# Patient Record
Sex: MALE | Race: WHITE | NOT HISPANIC OR LATINO | Employment: OTHER | URBAN - METROPOLITAN AREA
[De-identification: names, ages, dates, MRNs, and addresses within clinical notes are randomized per-mention and may not be internally consistent; named-entity substitution may affect disease eponyms.]

---

## 2017-01-17 ENCOUNTER — APPOINTMENT (OUTPATIENT)
Dept: ULTRASOUND IMAGING | Facility: HOSPITAL | Age: 67
End: 2017-01-17

## 2017-01-17 ENCOUNTER — HOSPITAL ENCOUNTER (EMERGENCY)
Facility: HOSPITAL | Age: 67
Discharge: HOME | End: 2017-01-17
Attending: EMERGENCY MEDICINE | Admitting: EMERGENCY MEDICINE

## 2017-01-17 VITALS
DIASTOLIC BLOOD PRESSURE: 72 MMHG | TEMPERATURE: 98.2 F | BODY MASS INDEX: 20.46 KG/M2 | HEART RATE: 65 BPM | SYSTOLIC BLOOD PRESSURE: 101 MMHG | OXYGEN SATURATION: 100 % | WEIGHT: 135 LBS | RESPIRATION RATE: 16 BRPM | HEIGHT: 68 IN

## 2017-01-17 DIAGNOSIS — S40.022A TRAUMATIC ECCHYMOSIS OF LEFT UPPER ARM, INITIAL ENCOUNTER: ICD-10-CM

## 2017-01-17 DIAGNOSIS — S40.812A ABRASION OF LEFT ARM, INITIAL ENCOUNTER: ICD-10-CM

## 2017-01-17 DIAGNOSIS — S40.022A TRAUMATIC HEMATOMA OF LEFT UPPER ARM, INITIAL ENCOUNTER: Primary | ICD-10-CM

## 2017-01-17 DIAGNOSIS — S20.419A ABRASION OF BACK, UNSPECIFIED LATERALITY, INITIAL ENCOUNTER: ICD-10-CM

## 2017-01-17 LAB
ALBUMIN SERPL-MCNC: 3.5 G/DL (ref 3.5–5.2)
ALBUMIN/GLOB SERPL: 1.6 G/DL
ALP SERPL-CCNC: 84 U/L (ref 40–129)
ALT SERPL W P-5'-P-CCNC: 13 U/L (ref 5–41)
ANION GAP SERPL CALCULATED.3IONS-SCNC: 7.3 MMOL/L
AST SERPL-CCNC: 18 U/L (ref 5–40)
BILIRUB SERPL-MCNC: 1.6 MG/DL (ref 0.2–1.2)
BUN BLD-MCNC: 21 MG/DL (ref 8–23)
BUN/CREAT SERPL: 20.6 (ref 7–25)
CALCIUM SPEC-SCNC: 8.6 MG/DL (ref 8.8–10.5)
CHLORIDE SERPL-SCNC: 103 MMOL/L (ref 98–107)
CO2 SERPL-SCNC: 29.7 MMOL/L (ref 22–29)
CREAT BLD-MCNC: 1.02 MG/DL (ref 0.76–1.27)
GFR SERPL CREATININE-BSD FRML MDRD: 73 ML/MIN/1.73
GLOBULIN UR ELPH-MCNC: 2.2 GM/DL
GLUCOSE BLD-MCNC: 104 MG/DL (ref 65–99)
HCT VFR BLD AUTO: 40.3 % (ref 42–52)
HGB BLD-MCNC: 13.4 G/DL (ref 14–18)
INR PPP: 1.96 (ref 0.9–1.1)
POTASSIUM BLD-SCNC: 4.7 MMOL/L (ref 3.5–5.2)
PROT SERPL-MCNC: 5.7 G/DL (ref 6–8.5)
PROTHROMBIN TIME: 22.6 SECONDS (ref 12.1–15)
SODIUM BLD-SCNC: 140 MMOL/L (ref 136–145)

## 2017-01-17 PROCEDURE — 93971 EXTREMITY STUDY: CPT

## 2017-01-17 PROCEDURE — 85014 HEMATOCRIT: CPT | Performed by: EMERGENCY MEDICINE

## 2017-01-17 PROCEDURE — 85610 PROTHROMBIN TIME: CPT | Performed by: EMERGENCY MEDICINE

## 2017-01-17 PROCEDURE — 99283 EMERGENCY DEPT VISIT LOW MDM: CPT | Performed by: EMERGENCY MEDICINE

## 2017-01-17 PROCEDURE — 99283 EMERGENCY DEPT VISIT LOW MDM: CPT

## 2017-01-17 PROCEDURE — 85018 HEMOGLOBIN: CPT | Performed by: EMERGENCY MEDICINE

## 2017-01-17 PROCEDURE — 80053 COMPREHEN METABOLIC PANEL: CPT | Performed by: EMERGENCY MEDICINE

## 2017-01-17 RX ORDER — FLUOXETINE HYDROCHLORIDE 20 MG/1
20 CAPSULE ORAL DAILY
COMMUNITY

## 2017-01-17 RX ORDER — TAMSULOSIN HYDROCHLORIDE 0.4 MG/1
1 CAPSULE ORAL NIGHTLY
COMMUNITY

## 2017-01-17 RX ORDER — METHYLPREDNISOLONE 4 MG/1
2 TABLET ORAL DAILY
COMMUNITY

## 2017-01-17 RX ORDER — FINASTERIDE 5 MG/1
5 TABLET, FILM COATED ORAL DAILY
COMMUNITY

## 2017-01-17 RX ORDER — OMEGA-3S/DHA/EPA/FISH OIL/D3 300MG-1000
400 CAPSULE ORAL 2 TIMES DAILY
COMMUNITY

## 2017-01-17 RX ORDER — ACETAMINOPHEN 325 MG/1
650 TABLET ORAL EVERY 6 HOURS PRN
COMMUNITY

## 2017-01-17 RX ORDER — PRAVASTATIN SODIUM 40 MG
40 TABLET ORAL DAILY
COMMUNITY

## 2017-01-17 RX ORDER — SENNA AND DOCUSATE SODIUM 50; 8.6 MG/1; MG/1
2 TABLET, FILM COATED ORAL NIGHTLY
COMMUNITY

## 2017-01-17 RX ORDER — ASPIRIN 81 MG/1
81 TABLET ORAL DAILY
COMMUNITY

## 2017-01-17 RX ORDER — CLONAZEPAM 0.5 MG/1
0.5 TABLET ORAL 3 TIMES DAILY PRN
COMMUNITY

## 2017-01-17 NOTE — ED PROVIDER NOTES
"Subjective   History of Present Illness  History of Present Illness    Chief complaint: Left arm swelling and ecchymosis    Location: Mid biceps and distally    Quality/Severity:  Minimal discomfort, \"itches \"    Timing/Duration: Began gradually yesterday as a bruise and extended until it was black and blue all over today when he awakened    Modifying Factors: No exacerbating or relieving factors    Associated Symptoms: No chest pain, no new shortness of breath.    Narrative: 66-year-old male who is anticoagulated with Xarelto presents with ecchymosis of his left upper extremity.  No specific trauma identified.    Review of Systems  Patient complains of a rash/skin lesion to his ulnar area.  All other systems reviewed and are negative as related chief complaint.    Past Medical History   Diagnosis Date   • Back pain    • COPD (chronic obstructive pulmonary disease)    • Depression    • Hyperlipidemia    • Hypertension    • Myocardial infarction    • Pneumonia    • Shingles        No Known Allergies    Past Surgical History   Procedure Laterality Date   • Breast surgery         History reviewed. No pertinent family history.    Social History     Social History   • Marital status:      Spouse name: N/A   • Number of children: N/A   • Years of education: N/A     Social History Main Topics   • Smoking status: Former Smoker   • Smokeless tobacco: None   • Alcohol use Yes      Comment: occasional   • Drug use: No   • Sexual activity: Not Asked     Other Topics Concern   • None     Social History Narrative   • None           Objective   Physical Exam   Constitutional: He is oriented to person, place, and time. No distress.   66-year-old male appears much older than stated age is on supplemental oxygen and appears to have advanced chronic disease but is in no acute distress at this time.   Cardiovascular: Normal rate and intact distal pulses.    Pulmonary/Chest: Effort normal. No respiratory distress. "   Musculoskeletal:        Back:         Arms:  Neurological: He is alert and oriented to person, place, and time.   Skin: Skin is warm and dry.   Psychiatric: He has a normal mood and affect. Thought content normal.       Procedures    Results for orders placed or performed during the hospital encounter of 01/17/17   Hemoglobin & Hematocrit, Blood   Result Value Ref Range    Hemoglobin 13.4 (L) 14.0 - 18.0 g/dL    Hematocrit 40.3 (L) 42.0 - 52.0 %   Protime-INR   Result Value Ref Range    Protime 22.6 (H) 12.1 - 15.0 Seconds    INR 1.96 (H) 0.90 - 1.10   Comprehensive Metabolic Panel   Result Value Ref Range    Glucose 104 (H) 65 - 99 mg/dL    BUN 21 8 - 23 mg/dL    Creatinine 1.02 0.76 - 1.27 mg/dL    Sodium 140 136 - 145 mmol/L    Potassium 4.7 3.5 - 5.2 mmol/L    Chloride 103 98 - 107 mmol/L    CO2 29.7 (H) 22.0 - 29.0 mmol/L    Calcium 8.6 (L) 8.8 - 10.5 mg/dL    Total Protein 5.7 (L) 6.0 - 8.5 g/dL    Albumin 3.50 3.50 - 5.20 g/dL    ALT (SGPT) 13 5 - 41 U/L    AST (SGOT) 18 5 - 40 U/L    Alkaline Phosphatase 84 40 - 129 U/L    Total Bilirubin 1.6 (H) 0.2 - 1.2 mg/dL    eGFR Non African Amer 73 >60 mL/min/1.73    Globulin 2.2 gm/dL    A/G Ratio 1.6 g/dL    BUN/Creatinine Ratio 20.6 7.0 - 25.0    Anion Gap 7.3 mmol/L     EXAM: VENOUS DOPPLER ULTRASOUND, LEFT UPPER EXTREMITY, 01/17/2017:      HISTORY:   66-year-old male complaining of left upper extremity redness, swelling  and itching beginning earlier today.      TECHNIQUE:   Venous Doppler ultrasound examination of the left upper extremity was  performed using grey-scale, spectral Doppler, and color flow Doppler  ultrasound imaging.      FINDINGS:   The examination is negative. There is no evidence of deep venous  thrombosis in the internal jugular vein, subclavian vein, axillary vein  or brachial veins. There is no visible superficial venous thrombosis  within the cephalic or basilic veins.      IMPRESSION:  Negative examination. No evidence of left upper  extremity venous  thrombosis.      This report was finalized on 1/17/2017 4:50 PM by Dr. Richard Chavez MD.       ED Course  ED Course   Comment By Time   Check labs and ultrasound performed.  Patient and family agreeable. Gal Treadwell MD 01/17 0668                  [facility]    Final diagnoses:   Traumatic hematoma of left upper arm, initial encounter   Traumatic ecchymosis of left upper arm, initial encounter   Abrasion of left arm, initial encounter   Abrasion of back, unspecified laterality, initial encounter              Medication List      Notice     No changes were made to your prescriptions during this visit.               Gal Treadwell MD  01/17/17 [id number]

## 2017-01-17 NOTE — ED NOTES
Dr Levin aware that pt's meds are not listed.  Pt's son/caregiver is on the way with his bottles of meds.      Autumn Douglass RN  01/17/17 [id number]

## 2017-06-02 ENCOUNTER — OUTSIDE FACILITY SERVICE (OUTPATIENT)
Dept: HOSPITALIST | Facility: HOSPITAL | Age: 67
End: 2017-06-02

## 2017-06-02 PROCEDURE — 99304 1ST NF CARE SF/LOW MDM 25: CPT | Performed by: HOSPITALIST

## 2017-06-07 ENCOUNTER — OUTSIDE FACILITY SERVICE (OUTPATIENT)
Dept: HOSPITALIST | Facility: HOSPITAL | Age: 67
End: 2017-06-07

## 2017-06-07 PROCEDURE — 99307 SBSQ NF CARE SF MDM 10: CPT | Performed by: INTERNAL MEDICINE

## 2017-06-21 ENCOUNTER — OUTSIDE FACILITY SERVICE (OUTPATIENT)
Dept: HOSPITALIST | Facility: HOSPITAL | Age: 67
End: 2017-06-21

## 2017-06-21 PROCEDURE — 99315 NF DSCHRG MGMT 30 MIN/LESS: CPT | Performed by: HOSPITALIST

## 2018-06-06 ENCOUNTER — PATIENT OUTREACH (OUTPATIENT)
Dept: CASE MANAGEMENT | Facility: OTHER | Age: 68
End: 2018-06-06

## 2018-06-06 NOTE — OUTREACH NOTE
Number listed is patient's son, per patient son his dad goes to [facility] for all [facility]. Explained role of Care Advisor and contact information given to son.No other questions or concerns voiced at this time.